# Patient Record
Sex: MALE | Race: BLACK OR AFRICAN AMERICAN
[De-identification: names, ages, dates, MRNs, and addresses within clinical notes are randomized per-mention and may not be internally consistent; named-entity substitution may affect disease eponyms.]

---

## 2018-05-09 ENCOUNTER — HOSPITAL ENCOUNTER (EMERGENCY)
Dept: HOSPITAL 24 - ER | Age: 28
Discharge: HOME | End: 2018-05-09
Payer: SELF-PAY

## 2018-05-09 VITALS — DIASTOLIC BLOOD PRESSURE: 62 MMHG | SYSTOLIC BLOOD PRESSURE: 119 MMHG

## 2018-05-09 VITALS — BODY MASS INDEX: 25.8 KG/M2

## 2018-05-09 DIAGNOSIS — K05.10: Primary | ICD-10-CM

## 2018-05-09 DIAGNOSIS — K08.89: ICD-10-CM

## 2018-05-09 PROCEDURE — 99282 EMERGENCY DEPT VISIT SF MDM: CPT

## 2020-08-21 NOTE — DR.GENAD
HPI





- PCP


Primary Care Physician: NFRY





- HPI Comment


HPI Comment: NO FEVER. PAIN ON AND OFF FOR SOME TIME. WORSE 2 DAYS AGO.





- Complaint/Symptoms


Chief Complaint Doctors Comments: TOOTHACHE, RT LOWER 2ND MOLAR PAINFUL.


Chief Complaint:: RT LOWER TOOTHACHE





- Nurses notes reviewed


Nurses Notes Review: Yes





- Source


History Provided: Patient





- Mode of Arrival


Mode of Arrival: Ambulatory





- Timing


Onset of Chief Complaint: 05/07/18





PMH





- PMH


Past Medical History: No


Past Surgical History: No





- Family History


History of Family Medical Conditions: No





- Social History


Type of Tobacco Use: Cigarettes


Does any household member use tobacco: Yes


Alcohol Use: Occasionally


Do you use any recreational Drugs:: No


Lives With: Family


Lives Where: Home





- infectious screening


In the last 2 months have you had wt loss of >10#?: NO


Have you had fever, night sweats or hemotysis?: No


Have you traveled outside the country in the last 6 months?: No


Isolation: Standard





ROS





- Review of Systems


Constitutional: No Symptoms Reported


Eyes: No Symptoms Reported


ENTM: Mouth Pain (RIGHT LOWER 2ND MOLAR PAINFUL)


Respiratoy: No Symptoms Reported


Cardiovascular: No Symptoms Reported


Gastrointestinal/Abdominal: No Symptoms Reported


Genitourinary: No Symptoms Reported


Neurological: No Symptoms Reported


Musculoskeletal: No Symptoms Reported


Integumentary: No Symptoms Reported


Hematologic/Lymphatic: No Symptoms Reported


Endocrine: No Symptoms Reported


All Other Systems: Reviewed and Negative





PE





- Vital Signs


Vitals: 


 





Temperature                      98.2 F


Pulse Rate                       59


Respiratory Rate                 18


Blood Pressure                   119/62


O2 Sat by Pulse Oximetry         100











- General


Limitations: No Limitations


General Appearance: Alert





- Head


Head Exam: Normal Inspection





- Eyes


Eye exam: Normal Appearance





- ENT


ENT Exam: Normal  External Ear Exam, TM's Normal Bilaterally.  negative: Normal 

Oropharynx (RIGHT LOWER SECONG MOLAR INFLAME, GUM RED AND TENDER.)


External Ear Exam: Normal External Inspection


TM/Canal Exam: Bilateral Normal


Nose Exam: Normal Nose Exam


Mouth Exam: Normal Inspection


Throat Exam: Normal Inspection





- Neck


Neck Exam: Trachea Midline





- Chest


Chest Inspection: Symmetric Chest Wall Rise





- Respiratory


Respiratory Exam: Normal Lung Sounds Bilat


Respiratory Exam: Bilateral Clear to Auscultation





- Cardiovascular


Cardiovascular Exam: Regular Rate, Normal Rhythm, Normal Heart Sounds





- Abdominal Exam


Abdominal Exam: Normal Bowel Sounds, Soft.  negative: Tenderness





- Back


Back Exam: Normal Inspection





- Neurologic


Neurological Exam: Alert, Oriented X3, CN II-XII Intact, Normal Gait, Reflexes 

Normal.  negative: Motor Sensory Deficit





- Psychiatric


Psychiatric Exam: Normal Affect, Normal Mood





- Skin


Skin Exam: Erythema





MDM





- Differential Diagnosis


Differential Diagnosis: DENTAL PAIN, GINGIVITIS.





Course





- Treatment


Treatment: SEE ORDERS.





- Education/Counseling


Education/Counseling: Patient, Education


Educated On: Diagnosis, Needs for Follow Up





- Diagnosis


Discharge Problem: 


 Toothache, Gingivitis








- Discharge Plan


Disposition: 01 HOME, SELF-CARE


Condition: Stable


Prescriptions: 


Amoxicillin [Amoxil 875 mg] 875 mg PO Q12H #20 tab


Ibuprofen [MOTRIN  MG *] 800 mg PO BID PRN #30 tab


 PRN Reason: Pain/Inflammation





- Follow ups/Referrals


Follow ups/Referrals: 


[facility],None [Primary Care Provider] - 3 days





- Instructions


Instructions:  Gingivitis, Easy-to-Read, Dental Abscess, Easy-to-Read


Additional Instructions: 


RETURN TO ED IF WORSE. FOLLOW UP WITH DENTIST OF YOUR CHOICE THIS WEEK.
supervision